# Patient Record
(demographics unavailable — no encounter records)

---

## 2025-04-22 NOTE — PHYSICAL EXAM
[TextEntry] : Physical Exam  General: No acute distress, well-appearing, obese Neuro: Alert and oriented x3, no focal deficits noted Psych: Appropriate mood and affect HEENT: Normocephalic, atraumatic Respiratory: Breathing comfortably on room air, normal effort and expansion Cardio: Regular rate by radial pulse, no cyanosis   Right hand exam benign findings, no mass or trigger fingers  Left hand: There is a small subcutaneous mass several millimeters in diameter located over the left long finger at the level of the A1 pulley.  It is firm but mobile.  It does not appear to involve the skin.  It moves with excursion of the flexor tendon.  No triggering of the joint or pain with palpation.  The remaining hand there are no masses or tenderness at the A1 pulleys.

## 2025-04-22 NOTE — DATA REVIEWED
[FreeTextEntry1] : I independently reviewed the 3 view xray of the hand performed at [place] 4/2/25: Left CMC arthrosis

## 2025-04-22 NOTE — HISTORY OF PRESENT ILLNESS
[FreeTextEntry1] : Patient is a pleasant 60-year-old left-hand-dominant female on disability and has multiple comorbidities including high blood pressure and obesity who presents to discuss a self found left palmar mass.  She says she noticed it a few weeks ago and went to the ER for evaluation.  They told her she had a ganglion cyst.  X-rays were negative at the time.  She has no complaints about the mass today.  No previous hand surgeries.  She has sprained the wrist before but this was a 20-30 years ago

## 2025-04-22 NOTE — PLAN
[TextEntry] : Patient presents today with subcutaneous mass of the left long finger involved with the flexor tendon at the level of the A1 pulley.  Nontender and x-rays are negative.  Differential includes giant cell tumor of the tendon sheath and ganglion cyst most likely, additional benign and more likely malignant lesions.  Is not very symptomatic where she would like to know what it is.  Will order for MRI with and without contrast for evaluation and she will follow-up after the imaging is performed

## 2025-05-06 NOTE — HISTORY OF PRESENT ILLNESS
[FreeTextEntry1] : Patient is a pleasant 60-year-old left-hand-dominant female on disability and has multiple comorbidities including high blood pressure and obesity who presents to discuss a self found left palmar mass.  She says she noticed it a few weeks ago and went to the ER for evaluation.  They told her she had a ganglion cyst.  X-rays were negative at the time.  She has no complaints about the mass today.  No previous hand surgeries.  She has sprained the wrist before but this was a 20-30 years ago.  Clinic 5/6/2025: Patient returns today after having MRI of the left upper extremity.  She continues to have pain at the mass site.  No other complaints today.

## 2025-05-06 NOTE — PLAN
[TextEntry] : Patient presents today with subcutaneous mass of the left long finger involved with the flexor tendon at the level of the A1 pulley.  Nontender and x-rays are negative.  MRI was completed and consistent with ganglion cyst at the level of A1 pulley left long finger flexor tendon sheath.  She continues to have pain without triggering.  She is interested in excision.  Will plan for excisional biopsy and incision of the flexor tendon sheath as well as A1 pulley

## 2025-05-06 NOTE — DATA REVIEWED
[FreeTextEntry1] : I independently reviewed the 3 view xray of the hand performed at [place] 4/2/25: Left CMC arthrosis  MRI performed at [facility] 5/2/25:  There is a mildly lobulated rounded structure along the radial aspect of the volar surface of the A1 pulley measuring 0.5 x 0.5 x 0.4 cm.  Impression: Mildly lobulated rounded structure favored to represent a ganglion cyst

## 2025-06-19 NOTE — DATA REVIEWED
[FreeTextEntry1] : I independently reviewed the 3 view xray of the hand performed at [place] 4/2/25: Left CMC arthrosis  MRI performed at [facility] 5/2/25:  There is a mildly lobulated rounded structure along the radial aspect of the volar surface of the A1 pulley measuring 0.5 x 0.5 x 0.4 cm.  Impression: Mildly lobulated rounded structure favored to represent a ganglion cyst   Surgical pathology 6/2/2025: Diagnosis Left long finger mass flexor tendon sheath: Ganglion cyst

## 2025-06-19 NOTE — PLAN
[TextEntry] : Status post excision of the ganglion cyst of the flexor tendon sheath and A1 pulley release.  Routine healing.  Pathology was reviewed with the patient.  All sutures removed dressed with Band-Aid.  Encouraged active and passive range of motion of the hand with fist pumps daily.  Declined hand therapy at this time.  Return to clinic in 3 months

## 2025-06-19 NOTE — PHYSICAL EXAM
[TextEntry] : Physical Exam  General: No acute distress, well-appearing, obese Neuro: Alert and oriented x3, no focal deficits noted Psych: Appropriate mood and affect HEENT: Normocephalic, atraumatic Respiratory: Breathing comfortably on room air, normal effort and expansion Cardio: Regular rate by radial pulse, no cyanosis   Right hand exam benign findings, no mass or trigger fingers  Left hand: Left palmar incision over A1 pulley of the long finger is clean dry intact with nylons in place.  No evidence of acute infection.  All sutures removed and well-tolerated.  Bandage with a Band-Aid